# Patient Record
Sex: MALE | Race: WHITE | Employment: FULL TIME
[De-identification: names, ages, dates, MRNs, and addresses within clinical notes are randomized per-mention and may not be internally consistent; named-entity substitution may affect disease eponyms.]

---

## 2017-02-16 ENCOUNTER — OFFICE VISIT (OUTPATIENT)
Dept: PODIATRY | Facility: CLINIC | Age: 22
End: 2017-02-16

## 2017-02-16 VITALS
HEIGHT: 72 IN | DIASTOLIC BLOOD PRESSURE: 67 MMHG | SYSTOLIC BLOOD PRESSURE: 122 MMHG | BODY MASS INDEX: 27.09 KG/M2 | HEART RATE: 58 BPM | WEIGHT: 200 LBS

## 2017-02-16 DIAGNOSIS — S93.602A SPRAIN OF FOOT, LEFT, INITIAL ENCOUNTER: ICD-10-CM

## 2017-02-16 DIAGNOSIS — S93.325A LISFRANC DISLOCATION, LEFT, INITIAL ENCOUNTER: Primary | ICD-10-CM

## 2017-02-16 DIAGNOSIS — M79.672 LEFT FOOT PAIN: ICD-10-CM

## 2017-02-16 DIAGNOSIS — R60.0 EDEMA OF LEFT FOOT: ICD-10-CM

## 2017-02-16 PROCEDURE — L4360 PNEUMAT WALKING BOOT PRE CST: HCPCS | Performed by: PODIATRIST

## 2017-02-16 PROCEDURE — 99203 OFFICE O/P NEW LOW 30 MIN: CPT | Performed by: PODIATRIST

## 2017-02-16 PROCEDURE — 73630 X-RAY EXAM OF FOOT: CPT | Performed by: PODIATRIST

## 2017-02-16 ASSESSMENT — ENCOUNTER SYMPTOMS
NAUSEA: 0
SHORTNESS OF BREATH: 0
DIARRHEA: 0
BACK PAIN: 0
COLOR CHANGE: 0

## 2017-02-21 ENCOUNTER — ANESTHESIA EVENT (OUTPATIENT)
Dept: OPERATING ROOM | Age: 22
End: 2017-02-21
Payer: COMMERCIAL

## 2017-02-22 ENCOUNTER — ANESTHESIA (OUTPATIENT)
Dept: OPERATING ROOM | Age: 22
End: 2017-02-22
Payer: COMMERCIAL

## 2017-02-22 ENCOUNTER — APPOINTMENT (OUTPATIENT)
Dept: GENERAL RADIOLOGY | Age: 22
End: 2017-02-22
Attending: PODIATRIST
Payer: COMMERCIAL

## 2017-02-22 ENCOUNTER — HOSPITAL ENCOUNTER (OUTPATIENT)
Age: 22
Setting detail: OUTPATIENT SURGERY
Discharge: HOME | End: 2017-02-22
Attending: PODIATRIST | Admitting: PODIATRIST
Payer: COMMERCIAL

## 2017-02-22 VITALS
OXYGEN SATURATION: 97 % | SYSTOLIC BLOOD PRESSURE: 114 MMHG | HEIGHT: 72 IN | WEIGHT: 200 LBS | BODY MASS INDEX: 27.09 KG/M2 | DIASTOLIC BLOOD PRESSURE: 65 MMHG | RESPIRATION RATE: 18 BRPM | HEART RATE: 51 BPM | TEMPERATURE: 97.7 F

## 2017-02-22 VITALS — DIASTOLIC BLOOD PRESSURE: 51 MMHG | OXYGEN SATURATION: 99 % | SYSTOLIC BLOOD PRESSURE: 103 MMHG | TEMPERATURE: 96.3 F

## 2017-02-22 PROCEDURE — 73630 X-RAY EXAM OF FOOT: CPT | Performed by: RADIOLOGY

## 2017-02-22 PROCEDURE — 6360000002 HC RX W HCPCS: Performed by: ANESTHESIOLOGY

## 2017-02-22 PROCEDURE — 2500000003 HC RX 250 WO HCPCS: Performed by: PODIATRIST

## 2017-02-22 PROCEDURE — 6370000000 HC RX 637 (ALT 250 FOR IP): Performed by: ANESTHESIOLOGY

## 2017-02-22 PROCEDURE — 3600000012 HC SURGERY LEVEL 2 ADDTL 15MIN: Performed by: PODIATRIST

## 2017-02-22 PROCEDURE — 3600000002 HC SURGERY LEVEL 2 BASE: Performed by: PODIATRIST

## 2017-02-22 PROCEDURE — 3700000000 HC ANESTHESIA ATTENDED CARE: Performed by: PODIATRIST

## 2017-02-22 PROCEDURE — 6360000002 HC RX W HCPCS: Performed by: NURSE ANESTHETIST, CERTIFIED REGISTERED

## 2017-02-22 PROCEDURE — 2580000003 HC RX 258: Performed by: ANESTHESIOLOGY

## 2017-02-22 PROCEDURE — 2500000003 HC RX 250 WO HCPCS: Performed by: NURSE ANESTHETIST, CERTIFIED REGISTERED

## 2017-02-22 PROCEDURE — 7100000031 HC ASPR PHASE II RECOVERY - ADDTL 15 MIN: Performed by: PODIATRIST

## 2017-02-22 PROCEDURE — 7100000030 HC ASPR PHASE II RECOVERY - FIRST 15 MIN: Performed by: PODIATRIST

## 2017-02-22 PROCEDURE — 7100000001 HC PACU RECOVERY - ADDTL 15 MIN: Performed by: PODIATRIST

## 2017-02-22 PROCEDURE — C1713 ANCHOR/SCREW BN/BN,TIS/BN: HCPCS | Performed by: PODIATRIST

## 2017-02-22 PROCEDURE — 28615 REPAIR FOOT DISLOCATION: CPT | Performed by: PODIATRIST

## 2017-02-22 PROCEDURE — 7100000000 HC PACU RECOVERY - FIRST 15 MIN: Performed by: PODIATRIST

## 2017-02-22 PROCEDURE — 3700000001 HC ADD 15 MINUTES (ANESTHESIA): Performed by: PODIATRIST

## 2017-02-22 PROCEDURE — 2720000010 HC SURG SUPPLY STERILE: Performed by: PODIATRIST

## 2017-02-22 PROCEDURE — 73630 X-RAY EXAM OF FOOT: CPT

## 2017-02-22 PROCEDURE — 2500000003 HC RX 250 WO HCPCS

## 2017-02-22 DEVICE — LISFRANC SCREW
Type: IMPLANTABLE DEVICE | Site: FOOT | Status: FUNCTIONAL
Brand: CHARLOTTE

## 2017-02-22 RX ORDER — FENTANYL CITRATE 50 UG/ML
INJECTION, SOLUTION INTRAMUSCULAR; INTRAVENOUS PRN
Status: DISCONTINUED | OUTPATIENT
Start: 2017-02-22 | End: 2017-02-22 | Stop reason: SDUPTHER

## 2017-02-22 RX ORDER — HYDROCODONE BITARTRATE AND ACETAMINOPHEN 5; 325 MG/1; MG/1
2 TABLET ORAL PRN
Status: COMPLETED | OUTPATIENT
Start: 2017-02-22 | End: 2017-02-22

## 2017-02-22 RX ORDER — ONDANSETRON 2 MG/ML
INJECTION INTRAMUSCULAR; INTRAVENOUS PRN
Status: DISCONTINUED | OUTPATIENT
Start: 2017-02-22 | End: 2017-02-22 | Stop reason: SDUPTHER

## 2017-02-22 RX ORDER — ONDANSETRON 2 MG/ML
4 INJECTION INTRAMUSCULAR; INTRAVENOUS
Status: DISCONTINUED | OUTPATIENT
Start: 2017-02-22 | End: 2017-02-22 | Stop reason: HOSPADM

## 2017-02-22 RX ORDER — SODIUM CHLORIDE 0.9 % (FLUSH) 0.9 %
10 SYRINGE (ML) INJECTION EVERY 12 HOURS SCHEDULED
Status: DISCONTINUED | OUTPATIENT
Start: 2017-02-22 | End: 2017-02-22 | Stop reason: HOSPADM

## 2017-02-22 RX ORDER — MIDAZOLAM HYDROCHLORIDE 1 MG/ML
INJECTION INTRAMUSCULAR; INTRAVENOUS PRN
Status: DISCONTINUED | OUTPATIENT
Start: 2017-02-22 | End: 2017-02-22 | Stop reason: SDUPTHER

## 2017-02-22 RX ORDER — FENTANYL CITRATE 50 UG/ML
50 INJECTION, SOLUTION INTRAMUSCULAR; INTRAVENOUS EVERY 5 MIN PRN
Status: DISCONTINUED | OUTPATIENT
Start: 2017-02-22 | End: 2017-02-22 | Stop reason: HOSPADM

## 2017-02-22 RX ORDER — HYDROCODONE BITARTRATE AND ACETAMINOPHEN 5; 325 MG/1; MG/1
1 TABLET ORAL EVERY 6 HOURS PRN
Qty: 40 TABLET | Refills: 0 | Status: SHIPPED | OUTPATIENT
Start: 2017-02-22 | End: 2017-03-01

## 2017-02-22 RX ORDER — HYDROCODONE BITARTRATE AND ACETAMINOPHEN 5; 325 MG/1; MG/1
1 TABLET ORAL PRN
Status: COMPLETED | OUTPATIENT
Start: 2017-02-22 | End: 2017-02-22

## 2017-02-22 RX ORDER — SODIUM CHLORIDE 0.9 % (FLUSH) 0.9 %
10 SYRINGE (ML) INJECTION PRN
Status: DISCONTINUED | OUTPATIENT
Start: 2017-02-22 | End: 2017-02-22 | Stop reason: HOSPADM

## 2017-02-22 RX ORDER — FENTANYL CITRATE 50 UG/ML
25 INJECTION, SOLUTION INTRAMUSCULAR; INTRAVENOUS EVERY 5 MIN PRN
Status: DISCONTINUED | OUTPATIENT
Start: 2017-02-22 | End: 2017-02-22 | Stop reason: HOSPADM

## 2017-02-22 RX ORDER — LIDOCAINE HYDROCHLORIDE 10 MG/ML
1 INJECTION, SOLUTION EPIDURAL; INFILTRATION; INTRACAUDAL; PERINEURAL
Status: DISCONTINUED | OUTPATIENT
Start: 2017-02-22 | End: 2017-02-22 | Stop reason: HOSPADM

## 2017-02-22 RX ORDER — GLYCOPYRROLATE 0.2 MG/ML
INJECTION INTRAMUSCULAR; INTRAVENOUS PRN
Status: DISCONTINUED | OUTPATIENT
Start: 2017-02-22 | End: 2017-02-22 | Stop reason: SDUPTHER

## 2017-02-22 RX ORDER — IBUPROFEN 800 MG/1
800 TABLET ORAL EVERY 8 HOURS PRN
COMMUNITY
End: 2017-04-07 | Stop reason: ALTCHOICE

## 2017-02-22 RX ORDER — HYDRALAZINE HYDROCHLORIDE 20 MG/ML
5 INJECTION INTRAMUSCULAR; INTRAVENOUS EVERY 10 MIN PRN
Status: DISCONTINUED | OUTPATIENT
Start: 2017-02-22 | End: 2017-02-22 | Stop reason: HOSPADM

## 2017-02-22 RX ORDER — KETOROLAC TROMETHAMINE 30 MG/ML
INJECTION, SOLUTION INTRAMUSCULAR; INTRAVENOUS PRN
Status: DISCONTINUED | OUTPATIENT
Start: 2017-02-22 | End: 2017-02-22 | Stop reason: SDUPTHER

## 2017-02-22 RX ORDER — SODIUM CHLORIDE, SODIUM LACTATE, POTASSIUM CHLORIDE, CALCIUM CHLORIDE 600; 310; 30; 20 MG/100ML; MG/100ML; MG/100ML; MG/100ML
INJECTION, SOLUTION INTRAVENOUS CONTINUOUS
Status: DISCONTINUED | OUTPATIENT
Start: 2017-02-22 | End: 2017-02-22 | Stop reason: HOSPADM

## 2017-02-22 RX ORDER — LIDOCAINE HYDROCHLORIDE 10 MG/ML
INJECTION, SOLUTION EPIDURAL; INFILTRATION; INTRACAUDAL; PERINEURAL PRN
Status: DISCONTINUED | OUTPATIENT
Start: 2017-02-22 | End: 2017-02-22 | Stop reason: SDUPTHER

## 2017-02-22 RX ORDER — METOCLOPRAMIDE HYDROCHLORIDE 5 MG/ML
10 INJECTION INTRAMUSCULAR; INTRAVENOUS
Status: DISCONTINUED | OUTPATIENT
Start: 2017-02-22 | End: 2017-02-22 | Stop reason: HOSPADM

## 2017-02-22 RX ORDER — BUPIVACAINE HYDROCHLORIDE AND EPINEPHRINE 2.5; 5 MG/ML; UG/ML
INJECTION, SOLUTION EPIDURAL; INFILTRATION; INTRACAUDAL; PERINEURAL PRN
Status: DISCONTINUED | OUTPATIENT
Start: 2017-02-22 | End: 2017-02-22 | Stop reason: HOSPADM

## 2017-02-22 RX ORDER — DEXAMETHASONE SODIUM PHOSPHATE 4 MG/ML
INJECTION, SOLUTION INTRA-ARTICULAR; INTRALESIONAL; INTRAMUSCULAR; INTRAVENOUS; SOFT TISSUE PRN
Status: DISCONTINUED | OUTPATIENT
Start: 2017-02-22 | End: 2017-02-22 | Stop reason: SDUPTHER

## 2017-02-22 RX ORDER — MEPERIDINE HYDROCHLORIDE 25 MG/ML
12.5 INJECTION INTRAMUSCULAR; INTRAVENOUS; SUBCUTANEOUS EVERY 5 MIN PRN
Status: DISCONTINUED | OUTPATIENT
Start: 2017-02-22 | End: 2017-02-22 | Stop reason: HOSPADM

## 2017-02-22 RX ORDER — MORPHINE SULFATE 2 MG/ML
1 INJECTION, SOLUTION INTRAMUSCULAR; INTRAVENOUS EVERY 5 MIN PRN
Status: DISCONTINUED | OUTPATIENT
Start: 2017-02-22 | End: 2017-02-22 | Stop reason: HOSPADM

## 2017-02-22 RX ORDER — LABETALOL HYDROCHLORIDE 5 MG/ML
5 INJECTION, SOLUTION INTRAVENOUS EVERY 10 MIN PRN
Status: DISCONTINUED | OUTPATIENT
Start: 2017-02-22 | End: 2017-02-22 | Stop reason: HOSPADM

## 2017-02-22 RX ADMIN — SODIUM CHLORIDE, POTASSIUM CHLORIDE, SODIUM LACTATE AND CALCIUM CHLORIDE: 600; 310; 30; 20 INJECTION, SOLUTION INTRAVENOUS at 10:50

## 2017-02-22 RX ADMIN — DEXAMETHASONE SODIUM PHOSPHATE 8 MG: 4 INJECTION, SOLUTION INTRAMUSCULAR; INTRAVENOUS at 11:03

## 2017-02-22 RX ADMIN — LIDOCAINE HYDROCHLORIDE 50 MG: 10 INJECTION, SOLUTION EPIDURAL; INFILTRATION; INTRACAUDAL; PERINEURAL at 10:56

## 2017-02-22 RX ADMIN — GLYCOPYRROLATE 0.2 MG: 0.2 INJECTION INTRAMUSCULAR; INTRAVENOUS at 11:12

## 2017-02-22 RX ADMIN — SODIUM CHLORIDE, POTASSIUM CHLORIDE, SODIUM LACTATE AND CALCIUM CHLORIDE: 600; 310; 30; 20 INJECTION, SOLUTION INTRAVENOUS at 08:37

## 2017-02-22 RX ADMIN — FENTANYL CITRATE 100 MCG: 50 INJECTION, SOLUTION INTRAMUSCULAR; INTRAVENOUS at 10:56

## 2017-02-22 RX ADMIN — KETOROLAC TROMETHAMINE 30 MG: 30 INJECTION, SOLUTION INTRAMUSCULAR; INTRAVENOUS at 11:40

## 2017-02-22 RX ADMIN — HYDROMORPHONE HYDROCHLORIDE 0.5 MG: 1 INJECTION, SOLUTION INTRAMUSCULAR; INTRAVENOUS; SUBCUTANEOUS at 12:19

## 2017-02-22 RX ADMIN — HYDROCODONE BITARTRATE AND ACETAMINOPHEN 2 TABLET: 5; 325 TABLET ORAL at 13:21

## 2017-02-22 RX ADMIN — ONDANSETRON 4 MG: 2 INJECTION, SOLUTION INTRAMUSCULAR; INTRAVENOUS at 11:40

## 2017-02-22 RX ADMIN — HYDROMORPHONE HYDROCHLORIDE 0.5 MG: 1 INJECTION, SOLUTION INTRAMUSCULAR; INTRAVENOUS; SUBCUTANEOUS at 12:28

## 2017-02-22 RX ADMIN — MIDAZOLAM HYDROCHLORIDE 2 MG: 1 INJECTION, SOLUTION INTRAMUSCULAR; INTRAVENOUS at 10:50

## 2017-02-22 ASSESSMENT — PAIN DESCRIPTION - ORIENTATION
ORIENTATION: LEFT
ORIENTATION: LEFT

## 2017-02-22 ASSESSMENT — PAIN DESCRIPTION - LOCATION
LOCATION: FOOT
LOCATION: FOOT

## 2017-02-22 ASSESSMENT — PAIN SCALES - GENERAL
PAINLEVEL_OUTOF10: 9
PAINLEVEL_OUTOF10: 9
PAINLEVEL_OUTOF10: 8
PAINLEVEL_OUTOF10: 0
PAINLEVEL_OUTOF10: 9

## 2017-02-22 ASSESSMENT — PAIN DESCRIPTION - PAIN TYPE
TYPE: SURGICAL PAIN

## 2017-02-22 ASSESSMENT — PAIN DESCRIPTION - DESCRIPTORS: DESCRIPTORS: ACHING;THROBBING

## 2017-02-22 ASSESSMENT — PAIN DESCRIPTION - FREQUENCY: FREQUENCY: CONTINUOUS

## 2017-02-22 ASSESSMENT — PAIN - FUNCTIONAL ASSESSMENT: PAIN_FUNCTIONAL_ASSESSMENT: 0-10

## 2017-02-27 ENCOUNTER — OFFICE VISIT (OUTPATIENT)
Dept: PODIATRY | Facility: CLINIC | Age: 22
End: 2017-02-27

## 2017-02-27 VITALS
BODY MASS INDEX: 27.09 KG/M2 | WEIGHT: 200 LBS | HEIGHT: 72 IN | HEART RATE: 75 BPM | SYSTOLIC BLOOD PRESSURE: 116 MMHG | DIASTOLIC BLOOD PRESSURE: 75 MMHG

## 2017-02-27 DIAGNOSIS — S93.602D SPRAIN OF FOOT, LEFT, SUBSEQUENT ENCOUNTER: ICD-10-CM

## 2017-02-27 DIAGNOSIS — M79.672 LEFT FOOT PAIN: ICD-10-CM

## 2017-02-27 DIAGNOSIS — R60.0 EDEMA OF LEFT FOOT: ICD-10-CM

## 2017-02-27 DIAGNOSIS — S93.325D LISFRANC DISLOCATION, LEFT, SUBSEQUENT ENCOUNTER: Primary | ICD-10-CM

## 2017-02-27 PROCEDURE — 73630 X-RAY EXAM OF FOOT: CPT | Performed by: PODIATRIST

## 2017-02-27 PROCEDURE — 99024 POSTOP FOLLOW-UP VISIT: CPT | Performed by: PODIATRIST

## 2017-03-02 ASSESSMENT — ENCOUNTER SYMPTOMS
DIARRHEA: 0
SHORTNESS OF BREATH: 0
NAUSEA: 0
BACK PAIN: 0
COLOR CHANGE: 0

## 2017-03-10 ENCOUNTER — OFFICE VISIT (OUTPATIENT)
Dept: PODIATRY | Facility: CLINIC | Age: 22
End: 2017-03-10

## 2017-03-10 VITALS
BODY MASS INDEX: 27.09 KG/M2 | SYSTOLIC BLOOD PRESSURE: 121 MMHG | WEIGHT: 200 LBS | HEART RATE: 60 BPM | DIASTOLIC BLOOD PRESSURE: 69 MMHG | HEIGHT: 72 IN

## 2017-03-10 DIAGNOSIS — M79.672 LEFT FOOT PAIN: ICD-10-CM

## 2017-03-10 DIAGNOSIS — R60.0 EDEMA OF LEFT FOOT: ICD-10-CM

## 2017-03-10 DIAGNOSIS — S93.602D SPRAIN OF FOOT, LEFT, SUBSEQUENT ENCOUNTER: ICD-10-CM

## 2017-03-10 DIAGNOSIS — S93.325D LISFRANC DISLOCATION, LEFT, SUBSEQUENT ENCOUNTER: Primary | ICD-10-CM

## 2017-03-10 PROCEDURE — 99024 POSTOP FOLLOW-UP VISIT: CPT | Performed by: PODIATRIST

## 2017-03-10 ASSESSMENT — ENCOUNTER SYMPTOMS
SHORTNESS OF BREATH: 0
BACK PAIN: 0
COLOR CHANGE: 0
DIARRHEA: 0
NAUSEA: 0

## 2017-04-07 ENCOUNTER — OFFICE VISIT (OUTPATIENT)
Dept: PODIATRY | Age: 22
End: 2017-04-07

## 2017-04-07 VITALS
DIASTOLIC BLOOD PRESSURE: 73 MMHG | WEIGHT: 200 LBS | HEART RATE: 67 BPM | BODY MASS INDEX: 27.09 KG/M2 | SYSTOLIC BLOOD PRESSURE: 125 MMHG | HEIGHT: 72 IN

## 2017-04-07 DIAGNOSIS — S93.602D SPRAIN OF FOOT, LEFT, SUBSEQUENT ENCOUNTER: ICD-10-CM

## 2017-04-07 DIAGNOSIS — R60.0 EDEMA OF LEFT FOOT: ICD-10-CM

## 2017-04-07 DIAGNOSIS — M79.672 LEFT FOOT PAIN: ICD-10-CM

## 2017-04-07 DIAGNOSIS — S93.325D LISFRANC DISLOCATION, LEFT, SUBSEQUENT ENCOUNTER: Primary | ICD-10-CM

## 2017-04-07 PROCEDURE — 99024 POSTOP FOLLOW-UP VISIT: CPT | Performed by: PODIATRIST

## 2017-04-07 ASSESSMENT — ENCOUNTER SYMPTOMS
COLOR CHANGE: 0
NAUSEA: 0
BACK PAIN: 0
SHORTNESS OF BREATH: 0
DIARRHEA: 0

## 2017-05-03 ENCOUNTER — HOSPITAL ENCOUNTER (OUTPATIENT)
Age: 22
Setting detail: OUTPATIENT SURGERY
Discharge: HOME | End: 2017-05-03
Attending: PODIATRIST | Admitting: PODIATRIST
Payer: COMMERCIAL

## 2017-05-03 ENCOUNTER — ANESTHESIA (OUTPATIENT)
Dept: OPERATING ROOM | Age: 22
End: 2017-05-03
Payer: COMMERCIAL

## 2017-05-03 ENCOUNTER — ANESTHESIA EVENT (OUTPATIENT)
Dept: OPERATING ROOM | Age: 22
End: 2017-05-03
Payer: COMMERCIAL

## 2017-05-03 VITALS
SYSTOLIC BLOOD PRESSURE: 131 MMHG | DIASTOLIC BLOOD PRESSURE: 64 MMHG | HEART RATE: 50 BPM | TEMPERATURE: 97 F | HEIGHT: 72 IN | OXYGEN SATURATION: 95 % | WEIGHT: 210 LBS | RESPIRATION RATE: 14 BRPM | BODY MASS INDEX: 28.44 KG/M2

## 2017-05-03 VITALS — TEMPERATURE: 94.6 F | SYSTOLIC BLOOD PRESSURE: 99 MMHG | OXYGEN SATURATION: 99 % | DIASTOLIC BLOOD PRESSURE: 52 MMHG

## 2017-05-03 PROCEDURE — 20680 REMOVAL OF IMPLANT DEEP: CPT | Performed by: PODIATRIST

## 2017-05-03 PROCEDURE — 7100000000 HC PACU RECOVERY - FIRST 15 MIN: Performed by: PODIATRIST

## 2017-05-03 PROCEDURE — 6370000000 HC RX 637 (ALT 250 FOR IP): Performed by: ANESTHESIOLOGY

## 2017-05-03 PROCEDURE — 2500000003 HC RX 250 WO HCPCS: Performed by: PODIATRIST

## 2017-05-03 PROCEDURE — 88300 SURGICAL PATH GROSS: CPT

## 2017-05-03 PROCEDURE — 7100000001 HC PACU RECOVERY - ADDTL 15 MIN: Performed by: PODIATRIST

## 2017-05-03 PROCEDURE — 6360000002 HC RX W HCPCS: Performed by: NURSE ANESTHETIST, CERTIFIED REGISTERED

## 2017-05-03 PROCEDURE — 3700000000 HC ANESTHESIA ATTENDED CARE: Performed by: PODIATRIST

## 2017-05-03 PROCEDURE — 3600000002 HC SURGERY LEVEL 2 BASE: Performed by: PODIATRIST

## 2017-05-03 PROCEDURE — 2580000003 HC RX 258: Performed by: NURSE ANESTHETIST, CERTIFIED REGISTERED

## 2017-05-03 PROCEDURE — 3700000001 HC ADD 15 MINUTES (ANESTHESIA): Performed by: PODIATRIST

## 2017-05-03 PROCEDURE — 7100000031 HC ASPR PHASE II RECOVERY - ADDTL 15 MIN: Performed by: PODIATRIST

## 2017-05-03 PROCEDURE — 3600000012 HC SURGERY LEVEL 2 ADDTL 15MIN: Performed by: PODIATRIST

## 2017-05-03 PROCEDURE — 2580000003 HC RX 258: Performed by: ANESTHESIOLOGY

## 2017-05-03 PROCEDURE — 2500000003 HC RX 250 WO HCPCS: Performed by: NURSE ANESTHETIST, CERTIFIED REGISTERED

## 2017-05-03 PROCEDURE — 7100000030 HC ASPR PHASE II RECOVERY - FIRST 15 MIN: Performed by: PODIATRIST

## 2017-05-03 RX ORDER — MIDAZOLAM HYDROCHLORIDE 1 MG/ML
INJECTION INTRAMUSCULAR; INTRAVENOUS PRN
Status: DISCONTINUED | OUTPATIENT
Start: 2017-05-03 | End: 2017-05-03 | Stop reason: SDUPTHER

## 2017-05-03 RX ORDER — ONDANSETRON 2 MG/ML
INJECTION INTRAMUSCULAR; INTRAVENOUS PRN
Status: DISCONTINUED | OUTPATIENT
Start: 2017-05-03 | End: 2017-05-03 | Stop reason: SDUPTHER

## 2017-05-03 RX ORDER — LABETALOL HYDROCHLORIDE 5 MG/ML
5 INJECTION, SOLUTION INTRAVENOUS EVERY 10 MIN PRN
Status: DISCONTINUED | OUTPATIENT
Start: 2017-05-03 | End: 2017-05-03 | Stop reason: HOSPADM

## 2017-05-03 RX ORDER — OXYCODONE HYDROCHLORIDE AND ACETAMINOPHEN 5; 325 MG/1; MG/1
1 TABLET ORAL PRN
Status: COMPLETED | OUTPATIENT
Start: 2017-05-03 | End: 2017-05-03

## 2017-05-03 RX ORDER — DOXYCYCLINE HYCLATE 100 MG/1
100 CAPSULE ORAL DAILY
Qty: 7 CAPSULE | Refills: 0 | Status: SHIPPED | OUTPATIENT
Start: 2017-05-03 | End: 2017-05-10

## 2017-05-03 RX ORDER — SODIUM CHLORIDE, SODIUM LACTATE, POTASSIUM CHLORIDE, CALCIUM CHLORIDE 600; 310; 30; 20 MG/100ML; MG/100ML; MG/100ML; MG/100ML
INJECTION, SOLUTION INTRAVENOUS CONTINUOUS PRN
Status: DISCONTINUED | OUTPATIENT
Start: 2017-05-03 | End: 2017-05-03 | Stop reason: SDUPTHER

## 2017-05-03 RX ORDER — KETOROLAC TROMETHAMINE 30 MG/ML
INJECTION, SOLUTION INTRAMUSCULAR; INTRAVENOUS PRN
Status: DISCONTINUED | OUTPATIENT
Start: 2017-05-03 | End: 2017-05-03 | Stop reason: SDUPTHER

## 2017-05-03 RX ORDER — OXYCODONE HYDROCHLORIDE AND ACETAMINOPHEN 5; 325 MG/1; MG/1
2 TABLET ORAL EVERY 6 HOURS PRN
Qty: 30 TABLET | Refills: 0 | Status: SHIPPED | OUTPATIENT
Start: 2017-05-03 | End: 2017-05-19 | Stop reason: ALTCHOICE

## 2017-05-03 RX ORDER — PROMETHAZINE HYDROCHLORIDE 25 MG/ML
6.25 INJECTION, SOLUTION INTRAMUSCULAR; INTRAVENOUS
Status: DISCONTINUED | OUTPATIENT
Start: 2017-05-03 | End: 2017-05-03 | Stop reason: HOSPADM

## 2017-05-03 RX ORDER — MEPERIDINE HYDROCHLORIDE 25 MG/ML
12.5 INJECTION INTRAMUSCULAR; INTRAVENOUS; SUBCUTANEOUS EVERY 5 MIN PRN
Status: DISCONTINUED | OUTPATIENT
Start: 2017-05-03 | End: 2017-05-03 | Stop reason: HOSPADM

## 2017-05-03 RX ORDER — DEXAMETHASONE SODIUM PHOSPHATE 4 MG/ML
INJECTION, SOLUTION INTRA-ARTICULAR; INTRALESIONAL; INTRAMUSCULAR; INTRAVENOUS; SOFT TISSUE PRN
Status: DISCONTINUED | OUTPATIENT
Start: 2017-05-03 | End: 2017-05-03 | Stop reason: SDUPTHER

## 2017-05-03 RX ORDER — SODIUM CHLORIDE, SODIUM LACTATE, POTASSIUM CHLORIDE, CALCIUM CHLORIDE 600; 310; 30; 20 MG/100ML; MG/100ML; MG/100ML; MG/100ML
INJECTION, SOLUTION INTRAVENOUS CONTINUOUS
Status: DISCONTINUED | OUTPATIENT
Start: 2017-05-03 | End: 2017-05-03 | Stop reason: HOSPADM

## 2017-05-03 RX ORDER — BUPIVACAINE HYDROCHLORIDE AND EPINEPHRINE 2.5; 5 MG/ML; UG/ML
INJECTION, SOLUTION EPIDURAL; INFILTRATION; INTRACAUDAL; PERINEURAL PRN
Status: DISCONTINUED | OUTPATIENT
Start: 2017-05-03 | End: 2017-05-03 | Stop reason: HOSPADM

## 2017-05-03 RX ORDER — FENTANYL CITRATE 50 UG/ML
INJECTION, SOLUTION INTRAMUSCULAR; INTRAVENOUS PRN
Status: DISCONTINUED | OUTPATIENT
Start: 2017-05-03 | End: 2017-05-03 | Stop reason: SDUPTHER

## 2017-05-03 RX ORDER — LIDOCAINE HYDROCHLORIDE 10 MG/ML
INJECTION, SOLUTION EPIDURAL; INFILTRATION; INTRACAUDAL; PERINEURAL PRN
Status: DISCONTINUED | OUTPATIENT
Start: 2017-05-03 | End: 2017-05-03 | Stop reason: SDUPTHER

## 2017-05-03 RX ORDER — OXYCODONE HYDROCHLORIDE AND ACETAMINOPHEN 5; 325 MG/1; MG/1
2 TABLET ORAL PRN
Status: COMPLETED | OUTPATIENT
Start: 2017-05-03 | End: 2017-05-03

## 2017-05-03 RX ORDER — PROPOFOL 10 MG/ML
INJECTION, EMULSION INTRAVENOUS PRN
Status: DISCONTINUED | OUTPATIENT
Start: 2017-05-03 | End: 2017-05-03 | Stop reason: SDUPTHER

## 2017-05-03 RX ADMIN — PROPOFOL 160 MG: 10 INJECTION, EMULSION INTRAVENOUS at 08:52

## 2017-05-03 RX ADMIN — SODIUM CHLORIDE, POTASSIUM CHLORIDE, SODIUM LACTATE AND CALCIUM CHLORIDE: 600; 310; 30; 20 INJECTION, SOLUTION INTRAVENOUS at 08:32

## 2017-05-03 RX ADMIN — SODIUM CHLORIDE, POTASSIUM CHLORIDE, SODIUM LACTATE AND CALCIUM CHLORIDE: 600; 310; 30; 20 INJECTION, SOLUTION INTRAVENOUS at 08:50

## 2017-05-03 RX ADMIN — LIDOCAINE HYDROCHLORIDE 40 MG: 10 INJECTION, SOLUTION EPIDURAL; INFILTRATION; INTRACAUDAL; PERINEURAL at 08:52

## 2017-05-03 RX ADMIN — OXYCODONE HYDROCHLORIDE AND ACETAMINOPHEN 1 TABLET: 5; 325 TABLET ORAL at 10:52

## 2017-05-03 RX ADMIN — KETOROLAC TROMETHAMINE 30 MG: 30 INJECTION, SOLUTION INTRAMUSCULAR at 09:01

## 2017-05-03 RX ADMIN — MIDAZOLAM 2 MG: 1 INJECTION INTRAMUSCULAR; INTRAVENOUS at 08:50

## 2017-05-03 RX ADMIN — ONDANSETRON 4 MG: 2 INJECTION INTRAMUSCULAR; INTRAVENOUS at 09:01

## 2017-05-03 RX ADMIN — FENTANYL CITRATE 100 MCG: 50 INJECTION INTRAMUSCULAR; INTRAVENOUS at 08:52

## 2017-05-03 RX ADMIN — DEXAMETHASONE SODIUM PHOSPHATE 4 MG: 4 INJECTION, SOLUTION INTRAMUSCULAR; INTRAVENOUS at 09:01

## 2017-05-03 ASSESSMENT — PAIN SCALES - GENERAL
PAINLEVEL_OUTOF10: 0
PAINLEVEL_OUTOF10: 4
PAINLEVEL_OUTOF10: 4

## 2017-05-03 ASSESSMENT — PAIN DESCRIPTION - DESCRIPTORS: DESCRIPTORS: ACHING

## 2017-05-03 ASSESSMENT — PAIN - FUNCTIONAL ASSESSMENT: PAIN_FUNCTIONAL_ASSESSMENT: 0-10

## 2017-05-03 ASSESSMENT — PAIN DESCRIPTION - FREQUENCY: FREQUENCY: CONTINUOUS

## 2017-05-03 ASSESSMENT — PAIN DESCRIPTION - LOCATION: LOCATION: FOOT

## 2017-05-05 LAB — SURGICAL PATHOLOGY REPORT: NORMAL

## 2017-05-08 ENCOUNTER — OFFICE VISIT (OUTPATIENT)
Dept: PODIATRY | Age: 22
End: 2017-05-08
Payer: COMMERCIAL

## 2017-05-08 VITALS
HEART RATE: 71 BPM | SYSTOLIC BLOOD PRESSURE: 129 MMHG | BODY MASS INDEX: 27.77 KG/M2 | DIASTOLIC BLOOD PRESSURE: 67 MMHG | HEIGHT: 72 IN | WEIGHT: 205 LBS

## 2017-05-08 DIAGNOSIS — S93.325D LISFRANC DISLOCATION, LEFT, SUBSEQUENT ENCOUNTER: Primary | ICD-10-CM

## 2017-05-08 DIAGNOSIS — M79.672 LEFT FOOT PAIN: ICD-10-CM

## 2017-05-08 DIAGNOSIS — R60.0 EDEMA OF LEFT FOOT: ICD-10-CM

## 2017-05-08 DIAGNOSIS — S93.602D SPRAIN OF FOOT, LEFT, SUBSEQUENT ENCOUNTER: ICD-10-CM

## 2017-05-08 PROCEDURE — 99024 POSTOP FOLLOW-UP VISIT: CPT | Performed by: PODIATRIST

## 2017-05-08 PROCEDURE — 73630 X-RAY EXAM OF FOOT: CPT | Performed by: PODIATRIST

## 2017-05-08 ASSESSMENT — ENCOUNTER SYMPTOMS
BACK PAIN: 0
SHORTNESS OF BREATH: 0
NAUSEA: 0
COLOR CHANGE: 0
DIARRHEA: 0

## 2017-05-10 ENCOUNTER — TELEPHONE (OUTPATIENT)
Dept: PODIATRY | Age: 22
End: 2017-05-10

## 2017-05-19 ENCOUNTER — OFFICE VISIT (OUTPATIENT)
Dept: PODIATRY | Age: 22
End: 2017-05-19

## 2017-05-19 VITALS
HEART RATE: 67 BPM | WEIGHT: 205 LBS | SYSTOLIC BLOOD PRESSURE: 130 MMHG | DIASTOLIC BLOOD PRESSURE: 78 MMHG | BODY MASS INDEX: 27.77 KG/M2 | HEIGHT: 72 IN

## 2017-05-19 DIAGNOSIS — S93.325D LISFRANC DISLOCATION, LEFT, SUBSEQUENT ENCOUNTER: Primary | ICD-10-CM

## 2017-05-19 DIAGNOSIS — R60.0 EDEMA OF LEFT FOOT: ICD-10-CM

## 2017-05-19 DIAGNOSIS — M79.672 LEFT FOOT PAIN: ICD-10-CM

## 2017-05-19 PROCEDURE — 99024 POSTOP FOLLOW-UP VISIT: CPT | Performed by: PODIATRIST

## 2017-05-24 ASSESSMENT — ENCOUNTER SYMPTOMS
COLOR CHANGE: 0
SHORTNESS OF BREATH: 0
BACK PAIN: 0
NAUSEA: 0
DIARRHEA: 0

## 2017-06-05 ENCOUNTER — OFFICE VISIT (OUTPATIENT)
Dept: PODIATRY | Age: 22
End: 2017-06-05
Payer: COMMERCIAL

## 2017-06-05 VITALS
BODY MASS INDEX: 27.77 KG/M2 | SYSTOLIC BLOOD PRESSURE: 129 MMHG | HEIGHT: 72 IN | WEIGHT: 205 LBS | HEART RATE: 75 BPM | DIASTOLIC BLOOD PRESSURE: 76 MMHG

## 2017-06-05 DIAGNOSIS — R60.0 EDEMA OF LEFT FOOT: ICD-10-CM

## 2017-06-05 DIAGNOSIS — S93.325D LISFRANC DISLOCATION, LEFT, SUBSEQUENT ENCOUNTER: Primary | ICD-10-CM

## 2017-06-05 PROCEDURE — 99024 POSTOP FOLLOW-UP VISIT: CPT | Performed by: PODIATRIST

## 2017-06-05 PROCEDURE — 73630 X-RAY EXAM OF FOOT: CPT | Performed by: PODIATRIST

## 2017-06-05 ASSESSMENT — ENCOUNTER SYMPTOMS
DIARRHEA: 0
NAUSEA: 0
BACK PAIN: 0
COLOR CHANGE: 0
SHORTNESS OF BREATH: 0

## 2017-06-20 ENCOUNTER — TELEPHONE (OUTPATIENT)
Dept: PODIATRY | Age: 22
End: 2017-06-20

## 2017-06-22 ENCOUNTER — OFFICE VISIT (OUTPATIENT)
Dept: PODIATRY | Age: 22
End: 2017-06-22
Payer: COMMERCIAL

## 2017-06-22 VITALS
DIASTOLIC BLOOD PRESSURE: 68 MMHG | HEART RATE: 65 BPM | SYSTOLIC BLOOD PRESSURE: 116 MMHG | HEIGHT: 72 IN | BODY MASS INDEX: 29.12 KG/M2 | WEIGHT: 215 LBS

## 2017-06-22 DIAGNOSIS — M79.672 LEFT FOOT PAIN: ICD-10-CM

## 2017-06-22 DIAGNOSIS — R29.898 WEAKNESS OF FOOT, LEFT: ICD-10-CM

## 2017-06-22 DIAGNOSIS — S93.325D LISFRANC DISLOCATION, LEFT, SUBSEQUENT ENCOUNTER: Primary | ICD-10-CM

## 2017-06-22 DIAGNOSIS — R60.0 EDEMA OF LEFT FOOT: ICD-10-CM

## 2017-06-22 PROCEDURE — 99024 POSTOP FOLLOW-UP VISIT: CPT | Performed by: PODIATRIST

## 2017-06-22 PROCEDURE — 73630 X-RAY EXAM OF FOOT: CPT | Performed by: PODIATRIST

## 2017-06-22 ASSESSMENT — ENCOUNTER SYMPTOMS
COLOR CHANGE: 0
BACK PAIN: 0
DIARRHEA: 0
NAUSEA: 0
SHORTNESS OF BREATH: 0

## 2018-03-05 ENCOUNTER — APPOINTMENT (OUTPATIENT)
Dept: CT IMAGING | Age: 23
End: 2018-03-05
Payer: COMMERCIAL

## 2018-03-05 ENCOUNTER — HOSPITAL ENCOUNTER (EMERGENCY)
Age: 23
Discharge: HOME | End: 2018-03-05
Attending: EMERGENCY MEDICINE
Payer: COMMERCIAL

## 2018-03-05 VITALS
HEIGHT: 72 IN | OXYGEN SATURATION: 100 % | BODY MASS INDEX: 29.12 KG/M2 | DIASTOLIC BLOOD PRESSURE: 72 MMHG | WEIGHT: 215 LBS | TEMPERATURE: 97.8 F | HEART RATE: 58 BPM | SYSTOLIC BLOOD PRESSURE: 136 MMHG | RESPIRATION RATE: 16 BRPM

## 2018-03-05 DIAGNOSIS — R79.89 ELEVATED SERUM CREATININE: ICD-10-CM

## 2018-03-05 DIAGNOSIS — R10.31 RIGHT LOWER QUADRANT ABDOMINAL PAIN: Primary | ICD-10-CM

## 2018-03-05 DIAGNOSIS — K57.30 DIVERTICULOSIS OF LARGE INTESTINE WITHOUT HEMORRHAGE: ICD-10-CM

## 2018-03-05 DIAGNOSIS — E86.0 DEHYDRATION: ICD-10-CM

## 2018-03-05 LAB
-: ABNORMAL
ABSOLUTE EOS #: 0.1 K/UL (ref 0–0.4)
ABSOLUTE IMMATURE GRANULOCYTE: ABNORMAL K/UL (ref 0–0.3)
ABSOLUTE LYMPH #: 1.8 K/UL (ref 1–4.8)
ABSOLUTE MONO #: 0.6 K/UL (ref 0.1–1.3)
ALBUMIN SERPL-MCNC: 4.5 G/DL (ref 3.5–5.2)
ALBUMIN/GLOBULIN RATIO: NORMAL (ref 1–2.5)
ALP BLD-CCNC: 71 U/L (ref 40–129)
ALT SERPL-CCNC: 32 U/L (ref 5–41)
AMORPHOUS: ABNORMAL
ANION GAP SERPL CALCULATED.3IONS-SCNC: 11 MMOL/L (ref 9–17)
AST SERPL-CCNC: 26 U/L
BACTERIA: ABNORMAL
BASOPHILS # BLD: 1 % (ref 0–2)
BASOPHILS ABSOLUTE: 0 K/UL (ref 0–0.2)
BILIRUB SERPL-MCNC: 0.37 MG/DL (ref 0.3–1.2)
BILIRUBIN DIRECT: 0.1 MG/DL
BILIRUBIN URINE: NEGATIVE
BILIRUBIN, INDIRECT: 0.27 MG/DL (ref 0–1)
BUN BLDV-MCNC: 14 MG/DL (ref 6–20)
BUN/CREAT BLD: ABNORMAL (ref 9–20)
C-REACTIVE PROTEIN: 0.9 MG/L (ref 0–5)
CALCIUM SERPL-MCNC: 9.6 MG/DL (ref 8.6–10.4)
CASTS UA: ABNORMAL /LPF
CHLORIDE BLD-SCNC: 98 MMOL/L (ref 98–107)
CO2: 30 MMOL/L (ref 20–31)
COLOR: YELLOW
COMMENT UA: ABNORMAL
CREAT SERPL-MCNC: 1.22 MG/DL (ref 0.7–1.2)
CRYSTALS, UA: ABNORMAL /HPF
DIFFERENTIAL TYPE: ABNORMAL
EOSINOPHILS RELATIVE PERCENT: 2 % (ref 0–4)
EPITHELIAL CELLS UA: ABNORMAL /HPF
GFR AFRICAN AMERICAN: >60 ML/MIN
GFR NON-AFRICAN AMERICAN: >60 ML/MIN
GFR SERPL CREATININE-BSD FRML MDRD: ABNORMAL ML/MIN/{1.73_M2}
GFR SERPL CREATININE-BSD FRML MDRD: ABNORMAL ML/MIN/{1.73_M2}
GLOBULIN: NORMAL G/DL (ref 1.5–3.8)
GLUCOSE BLD-MCNC: 92 MG/DL (ref 70–99)
GLUCOSE URINE: NEGATIVE
HCT VFR BLD CALC: 47.7 % (ref 41–53)
HEMOGLOBIN: 16.6 G/DL (ref 13.5–17.5)
IMMATURE GRANULOCYTES: ABNORMAL %
KETONES, URINE: NEGATIVE
LEUKOCYTE ESTERASE, URINE: NEGATIVE
LIPASE: 23 U/L (ref 13–60)
LYMPHOCYTES # BLD: 27 % (ref 25–45)
MCH RBC QN AUTO: 30.3 PG (ref 26–34)
MCHC RBC AUTO-ENTMCNC: 34.8 G/DL (ref 31–37)
MCV RBC AUTO: 87.2 FL (ref 80–100)
MONOCYTES # BLD: 9 % (ref 2–8)
MUCUS: ABNORMAL
NITRITE, URINE: NEGATIVE
NRBC AUTOMATED: ABNORMAL PER 100 WBC
OTHER OBSERVATIONS UA: ABNORMAL
PDW BLD-RTO: 12.6 % (ref 11.5–14.9)
PH UA: 7 (ref 5–8)
PLATELET # BLD: 253 K/UL (ref 150–450)
PLATELET ESTIMATE: ABNORMAL
PMV BLD AUTO: 8.7 FL (ref 6–12)
POTASSIUM SERPL-SCNC: 4.4 MMOL/L (ref 3.7–5.3)
PROTEIN UA: NEGATIVE
RBC # BLD: 5.47 M/UL (ref 4.5–5.9)
RBC # BLD: ABNORMAL 10*6/UL
RBC UA: ABNORMAL /HPF
RENAL EPITHELIAL, UA: ABNORMAL /HPF
SEG NEUTROPHILS: 61 % (ref 34–64)
SEGMENTED NEUTROPHILS ABSOLUTE COUNT: 4.1 K/UL (ref 1.3–9.1)
SODIUM BLD-SCNC: 139 MMOL/L (ref 135–144)
SPECIFIC GRAVITY UA: 1.02 (ref 1–1.03)
TOTAL PROTEIN: 7.6 G/DL (ref 6.4–8.3)
TRICHOMONAS: ABNORMAL
TURBIDITY: ABNORMAL
URINE HGB: NEGATIVE
UROBILINOGEN, URINE: NORMAL
WBC # BLD: 6.7 K/UL (ref 4.5–13.5)
WBC # BLD: ABNORMAL 10*3/UL
WBC UA: ABNORMAL /HPF
YEAST: ABNORMAL

## 2018-03-05 PROCEDURE — 80048 BASIC METABOLIC PNL TOTAL CA: CPT

## 2018-03-05 PROCEDURE — 99284 EMERGENCY DEPT VISIT MOD MDM: CPT

## 2018-03-05 PROCEDURE — 6360000004 HC RX CONTRAST MEDICATION: Performed by: EMERGENCY MEDICINE

## 2018-03-05 PROCEDURE — 2580000003 HC RX 258: Performed by: EMERGENCY MEDICINE

## 2018-03-05 PROCEDURE — 83690 ASSAY OF LIPASE: CPT

## 2018-03-05 PROCEDURE — 86140 C-REACTIVE PROTEIN: CPT

## 2018-03-05 PROCEDURE — 80076 HEPATIC FUNCTION PANEL: CPT

## 2018-03-05 PROCEDURE — 74177 CT ABD & PELVIS W/CONTRAST: CPT

## 2018-03-05 PROCEDURE — 36415 COLL VENOUS BLD VENIPUNCTURE: CPT

## 2018-03-05 PROCEDURE — 81001 URINALYSIS AUTO W/SCOPE: CPT

## 2018-03-05 PROCEDURE — 85025 COMPLETE CBC W/AUTO DIFF WBC: CPT

## 2018-03-05 RX ORDER — SODIUM CHLORIDE 0.9 % (FLUSH) 0.9 %
10 SYRINGE (ML) INJECTION PRN
Status: DISCONTINUED | OUTPATIENT
Start: 2018-03-05 | End: 2018-03-05 | Stop reason: HOSPADM

## 2018-03-05 RX ORDER — 0.9 % SODIUM CHLORIDE 0.9 %
100 INTRAVENOUS SOLUTION INTRAVENOUS ONCE
Status: COMPLETED | OUTPATIENT
Start: 2018-03-05 | End: 2018-03-05

## 2018-03-05 RX ADMIN — SODIUM CHLORIDE 100 ML: 9 INJECTION, SOLUTION INTRAVENOUS at 17:19

## 2018-03-05 RX ADMIN — IOPAMIDOL 100 ML: 755 INJECTION, SOLUTION INTRAVENOUS at 17:19

## 2018-03-05 RX ADMIN — Medication 10 ML: at 17:19

## 2018-03-05 ASSESSMENT — PAIN DESCRIPTION - ORIENTATION: ORIENTATION: RIGHT;LOWER

## 2018-03-05 ASSESSMENT — PAIN DESCRIPTION - LOCATION: LOCATION: ABDOMEN

## 2018-03-05 ASSESSMENT — PAIN DESCRIPTION - DESCRIPTORS: DESCRIPTORS: SHARP

## 2018-03-05 ASSESSMENT — PAIN DESCRIPTION - FREQUENCY: FREQUENCY: INTERMITTENT

## 2018-03-05 ASSESSMENT — PAIN SCALES - GENERAL: PAINLEVEL_OUTOF10: 7

## 2018-03-05 ASSESSMENT — PAIN DESCRIPTION - PAIN TYPE: TYPE: ACUTE PAIN

## 2018-03-05 NOTE — ED PROVIDER NOTES
or scribes is based on my personal performance of the HPI, PE and MDM. For Phys Assistant/ Nurse Practitioner cases/documentation I have had a face to face evaluation of this patient and have completed at least one if not all key elements of the E/M (history, physical exam, and MDM). Additional findings are as noted.     Flaco Haynes MD  Attending Emergency  Physician                Flaco Haynes MD  03/05/18 0102

## 2018-03-05 NOTE — ED PROVIDER NOTES
[address] [facility]  Emergency Department Encounter  Emergency Medicine Resident     Pt Name: Baylee Caldwell  MRN: [id number]  [place] 1995  Date of evaluation: 3/5/18  PCP:  Mejia Hernandez DO    CHIEF COMPLAINT       Chief Complaint   Patient presents with    Abdominal Pain       HISTORY OF PRESENT ILLNESS  (Location/Symptom, Timing/Onset, Context/Setting, Quality, Duration, Modifying Factors, Severity.)      Baylee Caldwell is a 25 y.o. male who presents with Right lower quadrant abdominal pain for the past 7 days. Patient states pain is intermittent, mild intensity, intermittent radiation to right groin. Patient denies constipation, diarrhea, dysuria, nausea, vomiting. Patient states he is sexually active but uses protection is no concern for STDs. Patient denies fever, chills, chest pain shortness of breath. Patient denies flank pain. PAST MEDICAL / SURGICAL / SOCIAL / FAMILY HISTORY      has no past medical history on file. has a past surgical history that includes Tonsillectomy; Tympanoplasty; other surgical history (Left, 02/22/2017); osteotomy (Left, 2/22/2017); other surgical history (Left, 05/03/2017); and HARDWARE REMOVAL FOOT / ANKLE (Left, 5/3/2017). Social History     Social History    Marital status: Single     Spouse name: N/A    Number of children: N/A    Years of education: N/A     Occupational History    Not on file.      Social History Main Topics    Smoking status: Never Smoker    Smokeless tobacco: Never Used    Alcohol use No    Drug use: No    Sexual activity: Not on file     Other Topics Concern    Not on file     Social History Narrative    No narrative on file       Family History   Problem Relation Age of Onset    Diabetes Father     Stroke Maternal Uncle        Allergies:  Amoxicillin and Benadryl [diphenhydramine]    Home Medications:  Prior to Admission medications    Not on File       REVIEW OF SYSTEMS    (2-9 systems for level 4, 10 or more for level 5)      Constitutional ROS - No recent fevers, No recent chills  Neurological ROS - No Headache, No Syncope  Opthalmologic ROS- No eye pain, No vision changes   ENT ROS - No sore throat, No congestion  Respiratory ROS - No cough, No shortness of breath  Cardiovascular ROS - No chest pain, No palpitations   Gastrointestinal ROS -+ abdominal pain, No nausea, No vomiting  Genito-Urinary ROS - No dysuria, No hematuria  Musculoskeletal ROS - No back pain, No neck pain  Dermatological ROS - No wound, No rash      PHYSICAL EXAM   (up to 7 for level 4, 8 or more for level 5)      INITIAL VITALS:   /72   Pulse 58   Temp 97.8 °F (36.6 °C) (Oral)   Resp 16   Ht 6' (1.829 m)   Wt 215 lb (97.5 kg)   SpO2 100%   BMI 29.16 kg/m²     CONSTITUTIONAL: AOx4, no apparent distress, appears stated age   HEAD: normocephalic, atraumatic   EYES: PERRL, EOMI    ENT: moist mucous membranes, uvula midline   NECK: supple, symmetric   BACK: symmetric   LUNGS: clear to auscultation bilaterally   CARDIOVASCULAR: regular rate and rhythm, no murmurs, rubs or gallops   ABDOMEN: soft, Mild right lower quadrant tenderness, non-distended   : Mild testicular tenderness to palpation, normal cremasteric reflex, no overlying cellulitis, no signs of hernia    NEUROLOGIC:  MAEx4, no focal sensory or motor deficits   MUSCULOSKELETAL: no clubbing, cyanosis or edema   SKIN: no exposed rash     DIFFERENTIAL  DIAGNOSIS     PLAN (LABS / IMAGING / EKG):  Orders Placed This Encounter   Procedures    CT ABDOMEN PELVIS W IV CONTRAST Additional Contrast? None    CBC Auto Differential    Basic Metabolic Panel    Hepatic Function Panel    Lipase    UA W/REFLEX CULTURE    C-Reactive Protein    Microscopic Urinalysis       MEDICATIONS ORDERED:  Orders Placed This Encounter   Medications    iopamidol (ISOVUE-370) 76 % injection 100 mL    0.9 % sodium chloride bolus    DISCONTD: sodium chloride flush 0.9 % injection 10 mL       DDX: Appendicitis, gastritis, gastroenteritis, epididymitis, diverticulosis, diverticulitis    DIAGNOSTIC RESULTS / EMERGENCY DEPARTMENT COURSE / MDM     LABS:  Results for orders placed or performed during the hospital encounter of 03/05/18   CBC Auto Differential   Result Value Ref Range    WBC 6.7 4.5 - 13.5 k/uL    RBC 5.47 4.5 - 5.9 m/uL    Hemoglobin 16.6 13.5 - 17.5 g/dL    Hematocrit 47.7 41 - 53 %    MCV 87.2 80 - 100 fL    MCH 30.3 26 - 34 pg    MCHC 34.8 31 - 37 g/dL    RDW 12.6 11.5 - 14.9 %    Platelets 635 076 - 679 k/uL    MPV 8.7 6.0 - 12.0 fL    NRBC Automated NOT REPORTED per 100 WBC    Differential Type NOT REPORTED     Seg Neutrophils 61 34 - 64 %    Lymphocytes 27 25 - 45 %    Monocytes 9 (H) 2 - 8 %    Eosinophils % 2 0 - 4 %    Basophils 1 0 - 2 %    Immature Granulocytes NOT REPORTED 0 %    Segs Absolute 4.10 1.3 - 9.1 k/uL    Absolute Lymph # 1.80 1.0 - 4.8 k/uL    Absolute Mono # 0.60 0.1 - 1.3 k/uL    Absolute Eos # 0.10 0.0 - 0.4 k/uL    Basophils # 0.00 0.0 - 0.2 k/uL    Absolute Immature Granulocyte NOT REPORTED 0.00 - 0.30 k/uL    WBC Morphology NOT REPORTED     RBC Morphology NOT REPORTED     Platelet Estimate NOT REPORTED    Basic Metabolic Panel   Result Value Ref Range    Glucose 92 70 - 99 mg/dL    BUN 14 6 - 20 mg/dL    CREATININE 1.22 (H) 0.70 - 1.20 mg/dL    Bun/Cre Ratio NOT REPORTED 9 - 20    Calcium 9.6 8.6 - 10.4 mg/dL    Sodium 139 135 - 144 mmol/L    Potassium 4.4 3.7 - 5.3 mmol/L    Chloride 98 98 - 107 mmol/L    CO2 30 20 - 31 mmol/L    Anion Gap 11 9 - 17 mmol/L    GFR Non-African American >60 >60 mL/min    GFR African American >60 >60 mL/min    GFR Comment          GFR Staging NOT REPORTED    Hepatic Function Panel   Result Value Ref Range    Alb 4.5 3.5 - 5.2 g/dL    Alkaline Phosphatase 71 40 - 129 U/L    ALT 32 5 - 41 U/L    AST 26 <40 U/L    Total Bilirubin 0.37 0.3 - 1.2 mg/dL    Bilirubin, Direct 0.10 <0.31 mg/dL    Bilirubin, Indirect 0.27 0.00 - 1.00 mg/dL    Total Chronic Type of Exam: Initial FINDINGS: Lower Chest: Lung bases are clear. Normal heart size. Small hiatal hernia. Organs: The liver, spleen, pancreas, adrenal glands and kidneys appear unremarkable. Gallbladder is contracted he. GI/Bowel: No evidence of bowel obstruction or inflammation. Mild sigmoid diverticulosis. Normal appendix. Pelvis: Bladder and prostate appear unremarkable. Peritoneum/Retroperitoneum: Normal caliber abdominal aorta. No suspicious lymphadenopathy. No ascites or free air. Bones/Soft Tissues: No significant osseous abnormality. Negative CT examination of the abdomen and pelvis with no evidence for acute process including normal appearing appendix. Few incidental/chronic findings as described. EKG      All EKG's are interpreted by the Emergency Department Physician who either signs or Co-signs this chart in the absence of a cardiologist.    EMERGENCY DEPARTMENT COURSE:  25 y.o. male with 1 week of intermittent right lower quadrant tenderness. Pain is very mild at this time even to deep palpation. Testicle exam is does not show any consistent findings with torsion or epididymitis. Urine is normal.  White blood cell count normal.  Patient informed of diverticulosis and dehydration. Instructed to follow-up with PCP. Patient feels comfortable being discharged home. PLAN/MEDS:  Orders Placed This Encounter   Procedures    CT ABDOMEN PELVIS W IV CONTRAST Additional Contrast? None    CBC Auto Differential    Basic Metabolic Panel    Hepatic Function Panel    Lipase    UA W/REFLEX CULTURE    C-Reactive Protein    Microscopic Urinalysis       Orders Placed This Encounter   Medications    iopamidol (ISOVUE-370) 76 % injection 100 mL    0.9 % sodium chloride bolus    DISCONTD: sodium chloride flush 0.9 % injection 10 mL         PROCEDURES:  None    CONSULTS:  None    CRITICAL CARE:  None    FINAL IMPRESSION      1. Right lower quadrant abdominal pain    2.  Elevated serum creatinine    3. Dehydration    4. Diverticulosis of large intestine without hemorrhage          DISPOSITION / PLAN     DISPOSITION Decision To Discharge 03/05/2018 05:57:42 PM      PATIENT REFERRED TO:  DO Vivian Young [address] [place] [address]  [phone]    Call in 1 day  for re-evaluation      DISCHARGE MEDICATIONS:  There are no discharge medications for this patient. Soni Ramso DO  Emergency Medicine Resident    Pre-hypertension/Hypertension: You have been informed that you may have pre-hypertension or Hypertension based on a blood pressure reading in the Emergency Department. I recommend you call the primary care provider listed on your discharge instructions or a physician of your choice this week to arrange follow up for further evaluation of possible pre-hypertension or Hypertension. (Please note that portions of this note were completed with a voice recognition program.  Efforts were made to edit the dictations but occasionally words are mis-transcribed.  Whenever words are used in this note in any gender, they shall be construed as though they were used in the gender appropriate to the circumstances; and whenever words are used in this note in the singular or plural form, they shall be construed as though they were used in the form appropriate to the circumstances.)       Soni aRmos DO  Resident  03/05/18 8316

## (undated) DEVICE — SPONGE GZ W4XL4IN RAYON POLY FILL CVR W/ NONWOVEN FAB

## (undated) DEVICE — SUTURE VIC + BR UD 3-0 27IN VCP423H

## (undated) DEVICE — SUTURE ETHBND EXCEL SZ 0 L30IN NONABSORBABLE GRN L26MM CT-2 X412H

## (undated) DEVICE — LISFRANC DRILL BIT: Brand: CHARLOTTE

## (undated) DEVICE — SUTURE VCRL + SZ 4-0 L27IN ABSRB WHT FS-2 3/8 CIR REV CUT VCP422H

## (undated) DEVICE — SUTURE VCRL + SZ 2-0 L27IN ABSRB UD CT-2 L26MM 1/2 CIR TAPR VCP269H

## (undated) DEVICE — 1200CC GUARDIAN II: Brand: GUARDIAN

## (undated) DEVICE — AIRWAY LARYN MASK CHILD SZ 4 30ML CUF FOR 50-70KG PT STD

## (undated) DEVICE — SOLUTION IV IRRIG POUR BRL 0.9% SODIUM CHL 2F7124

## (undated) DEVICE — NO USE 18 MONTHS GOWN STD LG  1153D

## (undated) DEVICE — TIBURON EXTREMITY SHEET: Brand: CONVERTORS

## (undated) DEVICE — INTENDED FOR TISSUE SEPARATION, AND OTHER PROCEDURES THAT REQUIRE A SHARP SURGICAL BLADE TO PUNCTURE OR CUT.: Brand: BARD-PARKER ® CARBON RIB-BACK BLADES

## (undated) DEVICE — PEN: MARKING STD 100/CS: Brand: MEDICAL ACTION INDUSTRIES

## (undated) DEVICE — 8FR FRAZIER SUCTION HANDLE: Brand: CARDINAL HEALTH

## (undated) DEVICE — DRAPE EQUIP C ARM MINIVIEW

## (undated) DEVICE — SOLUTION IV IRRIG WATER 1000ML POUR BRL 2F7114

## (undated) DEVICE — GAUZE,SPONGE,FLUFF,6"X6.75",STRL,5/TRAY: Brand: MEDLINE

## (undated) DEVICE — GAUZE,SPONGE,4"X4",16PLY,XRAY,STRL,LF: Brand: MEDLINE

## (undated) DEVICE — Device

## (undated) DEVICE — HYPODERMIC SAFETY NEEDLE: Brand: MAGELLAN

## (undated) DEVICE — BANDAGE,GAUZE,BULKEE II,4.5"X4.1YD,STRL: Brand: MEDLINE

## (undated) DEVICE — DRESSING PETRO W3XL3IN OIL EMUL N ADH GZ KNIT IMPREG CELOS

## (undated) DEVICE — LARGE TEAR CROSS CUT RASP, 7MM X 14MM: Brand: MICROAIRE®

## (undated) DEVICE — STANDARD HYPODERMIC NEEDLE,POLYPROPYLENE HUB: Brand: MONOJECT

## (undated) DEVICE — MEDI-VAC YANKAUER SUCTION HANDLE W/BULBOUS TIP: Brand: CARDINAL HEALTH

## (undated) DEVICE — BANDAGE GZ W2XL75IN ST RAYON POLY CNFRM STRTCH LTWT

## (undated) DEVICE — ESMARK: Brand: DEROYAL

## (undated) DEVICE — SINGLE TROCAR WIRE: Brand: CHARLOTTE

## (undated) DEVICE — 20 ML SYRINGE LUER-LOCK TIP: Brand: MONOJECT

## (undated) DEVICE — Device: Brand: LEVEL 1